# Patient Record
Sex: MALE | Race: WHITE | URBAN - METROPOLITAN AREA
[De-identification: names, ages, dates, MRNs, and addresses within clinical notes are randomized per-mention and may not be internally consistent; named-entity substitution may affect disease eponyms.]

---

## 2023-12-25 ENCOUNTER — NURSE TRIAGE (OUTPATIENT)
Dept: NURSING | Facility: CLINIC | Age: 38
End: 2023-12-25

## 2023-12-25 NOTE — TELEPHONE ENCOUNTER
Nurse Triage SBAR    Situation: Left arm swelling from Vaccine.     Background: Patient calling. Tetanus shot on 12/19. Working out still.     Assessment: Some swelling started and is now working its way down his arm. Using ice to the arm. Tympanic temp: 97.2 and temporal temp 100.4. Some pink area on the Bycep - larger than an inch. Some tenderness to the upper arm.     Protocol Recommended Disposition: See Physician within 4 hours (Or PCP Triage)    Recommendation: According to the protocol, Patient should be seen within 4 hours (Or PCP Triage). Advised Patient that the patient needs to be seen within 4 hours (Or PCP Triage). Care advice given. Patient verbalizes understanding and agrees with plan of care. Reviewed concerning symptoms and when to call back.     Treasure Riley RN Nursing Advisor 12/25/2023 5:02 PM     Reason for Disposition   [1] Redness or red streak around the injection site AND [2] begins > 48 hours after shot AND [3] fever    Additional Information   Negative: [1] Difficulty breathing or swallowing AND [2] starts within 2 hours after injection   Negative: Difficult to awaken or acting confused (e.g., disoriented, slurred speech)   Negative: Unresponsive, passed out, or very weak   Negative: Sounds like a life-threatening emergency to the triager   Negative: COVID-19 vaccine reaction suspected (e.g., fever, headache, muscle aches occurring during days 1-3 after getting vaccine)   Negative: COVID-19 vaccine, questions about   Negative: Fever > 104 F (40 C)   Negative: [1] Measles vaccine rash (onset day 6-12) AND [2] purple or blood-colored   Negative: Sounds like a severe, unusual reaction to the triager    Protocols used: Immunization [id number]

## 2024-03-07 ENCOUNTER — NURSE TRIAGE (OUTPATIENT)
Dept: NURSING | Facility: CLINIC | Age: 39
End: 2024-03-07

## 2024-03-07 NOTE — TELEPHONE ENCOUNTER
Patient is calling and states that he has a sore throat that started on Monday March 4 th morning and then fever and chills and sore neck and headache.  Uvula is swollen.  Fever is currently gone.        Reason for Disposition   SEVERE (e.g., excruciating) throat pain    Additional Information   Negative: SEVERE difficulty breathing (e.g., struggling for each breath, speaks in single words, stridor)   Negative: Sounds like a life-threatening emergency to the triager   Negative: [1] Drooling or spitting out saliva (because can't swallow) AND [2] normal breathing   Negative: Unable to open mouth completely   Negative: [1] Difficulty breathing AND [2] not severe   Negative: Fever > 104 F (40 C)   Negative: [1] Refuses to drink anything AND [2] for > 12 hours   Negative: [1] Drinking very little AND [2] dehydration suspected (e.g., no urine > 12 hours, very dry mouth, very lightheaded)   Negative: Patient sounds very sick or weak to the triager    Protocols used: Sore Throat-A-AH

## 2024-03-08 NOTE — TELEPHONE ENCOUNTER
Nurse Triage SBAR    Situation: Medication Question    Background:   -Patient calling  -It is okay to call back and leave a detailed message at this number:    Assessment:   -taking Amoxicillin, last dose 5 hours ago  -having gas pain for last 20 minutes   -Can he take Nyquil while on Amoxicillin. No interactions found per Up to Date resource.   -Declines triage.         Recommendation:     -Call back with and questions, concerns, or any change in symptoms           Reason for Disposition   Caller has medicine question, adult has minor symptoms, caller declines triage, AND triager answers question    Protocols used: Medication Question Call-A-